# Patient Record
Sex: MALE | HISPANIC OR LATINO | Employment: FULL TIME | URBAN - METROPOLITAN AREA
[De-identification: names, ages, dates, MRNs, and addresses within clinical notes are randomized per-mention and may not be internally consistent; named-entity substitution may affect disease eponyms.]

---

## 2021-11-22 ENCOUNTER — APPOINTMENT (OUTPATIENT)
Dept: GENERAL RADIOLOGY | Facility: CLINIC | Age: 21
End: 2021-11-22
Attending: EMERGENCY MEDICINE

## 2021-11-22 ENCOUNTER — HOSPITAL ENCOUNTER (EMERGENCY)
Facility: CLINIC | Age: 21
Discharge: HOME | End: 2021-11-22
Attending: NURSE PRACTITIONER | Admitting: NURSE PRACTITIONER

## 2021-11-22 VITALS
SYSTOLIC BLOOD PRESSURE: 168 MMHG | TEMPERATURE: 98.5 F | HEART RATE: 104 BPM | DIASTOLIC BLOOD PRESSURE: 85 MMHG | RESPIRATION RATE: 14 BRPM | HEIGHT: 63 IN | OXYGEN SATURATION: 99 %

## 2021-11-22 DIAGNOSIS — S93.409A ANKLE SPRAIN: ICD-10-CM

## 2021-11-22 PROCEDURE — 99283 EMERGENCY DEPT VISIT LOW MDM: CPT

## 2021-11-22 PROCEDURE — 73610 X-RAY EXAM OF ANKLE: CPT | Mod: RT

## 2021-11-22 ASSESSMENT — ENCOUNTER SYMPTOMS
WOUND: 0
COLOR CHANGE: 0
WEAKNESS: 0
NUMBNESS: 0
ARTHRALGIAS: 1

## 2021-11-22 NOTE — LETTER
November 22, 2021      To Whom It May Concern:      Clemente Layne was seen in our Emergency Department today, 11/22/21.  For ankle sprain    Sincerely,        CORTEZ Hughes CNP

## 2021-11-23 NOTE — DISCHARGE INSTRUCTIONS
Ibuprofen or Naproxen and/or Tylenol scheduled for the next 3-5 days. 600 mg (three tabs) ibuprofen, 440 mg (two tabs) Naproxen, 650-1000 mg of Tylenol.  Ibuprofen and Tylenol are every 6 hours Naproxen is 2 times daily. Follow directions.  I recommend ice in the first 24-48 hours. Apply ice for no more than 20 minutes at a time with at least an hour break in between applications.  3-5 times daily is recommended.

## 2021-11-23 NOTE — ED TRIAGE NOTES
Patient states hurt right ankle at work 2 weeks ago.  Still limping.  Also stepped on a nail to the left foot 5 days ago.  Just wants it looked at.

## 2022-05-16 ENCOUNTER — HOSPITAL ENCOUNTER (EMERGENCY)
Facility: CLINIC | Age: 22
Discharge: HOME | End: 2022-05-16
Attending: EMERGENCY MEDICINE | Admitting: EMERGENCY MEDICINE

## 2022-05-16 VITALS
SYSTOLIC BLOOD PRESSURE: 138 MMHG | OXYGEN SATURATION: 98 % | HEIGHT: 65 IN | BODY MASS INDEX: 38.32 KG/M2 | DIASTOLIC BLOOD PRESSURE: 75 MMHG | HEART RATE: 81 BPM | TEMPERATURE: 97.4 F | WEIGHT: 230 LBS | RESPIRATION RATE: 16 BRPM

## 2022-05-16 DIAGNOSIS — R10.32 LLQ ABDOMINAL PAIN: Primary | ICD-10-CM

## 2022-05-16 LAB
ALBUMIN SERPL-MCNC: 4.1 G/DL (ref 3.4–5)
ALBUMIN UR-MCNC: 10 MG/DL
ALP SERPL-CCNC: 88 U/L (ref 40–150)
ALT SERPL W P-5'-P-CCNC: 50 U/L (ref 0–70)
ANION GAP SERPL CALCULATED.3IONS-SCNC: 5 MMOL/L (ref 3–14)
APPEARANCE UR: CLEAR
AST SERPL W P-5'-P-CCNC: 49 U/L (ref 0–45)
BASOPHILS # BLD AUTO: 0.1 10E3/UL (ref 0–0.2)
BASOPHILS NFR BLD AUTO: 1 %
BILIRUB SERPL-MCNC: 0.6 MG/DL (ref 0.2–1.3)
BILIRUB UR QL STRIP: NEGATIVE
BUN SERPL-MCNC: 9 MG/DL (ref 7–30)
CALCIUM SERPL-MCNC: 9.7 MG/DL (ref 8.5–10.1)
CHLORIDE BLD-SCNC: 105 MMOL/L (ref 94–109)
CO2 SERPL-SCNC: 27 MMOL/L (ref 20–32)
COLOR UR AUTO: YELLOW
CREAT SERPL-MCNC: 0.69 MG/DL (ref 0.66–1.25)
EOSINOPHIL # BLD AUTO: 0.1 10E3/UL (ref 0–0.7)
EOSINOPHIL NFR BLD AUTO: 1 %
ERYTHROCYTE [DISTWIDTH] IN BLOOD BY AUTOMATED COUNT: 13 % (ref 10–15)
GFR SERPL CREATININE-BSD FRML MDRD: >90 ML/MIN/1.73M2
GLUCOSE BLD-MCNC: 97 MG/DL (ref 70–99)
GLUCOSE UR STRIP-MCNC: NEGATIVE MG/DL
HCT VFR BLD AUTO: 52 % (ref 40–53)
HGB BLD-MCNC: 17.2 G/DL (ref 13.3–17.7)
HGB UR QL STRIP: NEGATIVE
IMM GRANULOCYTES # BLD: 0.1 10E3/UL
IMM GRANULOCYTES NFR BLD: 1 %
KETONES UR STRIP-MCNC: NEGATIVE MG/DL
LEUKOCYTE ESTERASE UR QL STRIP: NEGATIVE
LIPASE SERPL-CCNC: 64 U/L (ref 73–393)
LYMPHOCYTES # BLD AUTO: 3 10E3/UL (ref 0.8–5.3)
LYMPHOCYTES NFR BLD AUTO: 23 %
MCH RBC QN AUTO: 27.9 PG (ref 26.5–33)
MCHC RBC AUTO-ENTMCNC: 33.1 G/DL (ref 31.5–36.5)
MCV RBC AUTO: 84 FL (ref 78–100)
MONOCYTES # BLD AUTO: 1 10E3/UL (ref 0–1.3)
MONOCYTES NFR BLD AUTO: 7 %
MUCOUS THREADS #/AREA URNS LPF: PRESENT /LPF
NEUTROPHILS # BLD AUTO: 9.1 10E3/UL (ref 1.6–8.3)
NEUTROPHILS NFR BLD AUTO: 67 %
NITRATE UR QL: NEGATIVE
NRBC # BLD AUTO: 0 10E3/UL
NRBC BLD AUTO-RTO: 0 /100
PH UR STRIP: 6.5 [PH] (ref 5–7)
PLATELET # BLD AUTO: 289 10E3/UL (ref 150–450)
POTASSIUM BLD-SCNC: 3.7 MMOL/L (ref 3.4–5.3)
PROT SERPL-MCNC: 8.1 G/DL (ref 6.8–8.8)
RBC # BLD AUTO: 6.17 10E6/UL (ref 4.4–5.9)
RBC URINE: <1 /HPF
SODIUM SERPL-SCNC: 137 MMOL/L (ref 133–144)
SP GR UR STRIP: 1.03 (ref 1–1.03)
UROBILINOGEN UR STRIP-MCNC: NORMAL MG/DL
WBC # BLD AUTO: 13.4 10E3/UL (ref 4–11)
WBC URINE: 1 /HPF

## 2022-05-16 PROCEDURE — 96374 THER/PROPH/DIAG INJ IV PUSH: CPT

## 2022-05-16 PROCEDURE — 36415 COLL VENOUS BLD VENIPUNCTURE: CPT | Performed by: STUDENT IN AN ORGANIZED HEALTH CARE EDUCATION/TRAINING PROGRAM

## 2022-05-16 PROCEDURE — 80053 COMPREHEN METABOLIC PANEL: CPT | Performed by: STUDENT IN AN ORGANIZED HEALTH CARE EDUCATION/TRAINING PROGRAM

## 2022-05-16 PROCEDURE — 250N000011 HC RX IP 250 OP 636: Performed by: STUDENT IN AN ORGANIZED HEALTH CARE EDUCATION/TRAINING PROGRAM

## 2022-05-16 PROCEDURE — 99284 EMERGENCY DEPT VISIT MOD MDM: CPT | Mod: 25

## 2022-05-16 PROCEDURE — 85025 COMPLETE CBC W/AUTO DIFF WBC: CPT | Performed by: STUDENT IN AN ORGANIZED HEALTH CARE EDUCATION/TRAINING PROGRAM

## 2022-05-16 PROCEDURE — 81001 URINALYSIS AUTO W/SCOPE: CPT | Performed by: STUDENT IN AN ORGANIZED HEALTH CARE EDUCATION/TRAINING PROGRAM

## 2022-05-16 PROCEDURE — 83690 ASSAY OF LIPASE: CPT | Performed by: STUDENT IN AN ORGANIZED HEALTH CARE EDUCATION/TRAINING PROGRAM

## 2022-05-16 RX ORDER — KETOROLAC TROMETHAMINE 15 MG/ML
15 INJECTION, SOLUTION INTRAMUSCULAR; INTRAVENOUS ONCE
Status: COMPLETED | OUTPATIENT
Start: 2022-05-16 | End: 2022-05-16

## 2022-05-16 RX ADMIN — KETOROLAC TROMETHAMINE 15 MG: 15 INJECTION, SOLUTION INTRAMUSCULAR; INTRAVENOUS at 18:15

## 2022-05-16 ASSESSMENT — ENCOUNTER SYMPTOMS
SORE THROAT: 0
BACK PAIN: 0
SHORTNESS OF BREATH: 0
APPETITE CHANGE: 0
CONSTIPATION: 0
COUGH: 0
FEVER: 0
BLOOD IN STOOL: 0
HEMATURIA: 0
CHILLS: 0
FLANK PAIN: 1
ABDOMINAL PAIN: 1
RHINORRHEA: 0
VOMITING: 0
NERVOUS/ANXIOUS: 0
DIARRHEA: 0
HEADACHES: 0
DYSURIA: 0
NAUSEA: 0

## 2022-05-16 NOTE — LETTER
May 16, 2022      To Whom It May Concern:      Clemente Layne was seen in our Emergency Department today, 05/16/22.  I expect his condition to improve.  He may return to work when improved.    Sincerely,        Mile Herndon RN

## 2022-05-16 NOTE — ED TRIAGE NOTES
LLQ abdomen pain last 3 days, some nausea but no vomiting. Been lifting more weights lately.      Triage Assessment     Row Name 05/16/22 1250       Triage Assessment (Adult)    Airway WDL WDL       Respiratory WDL    Respiratory WDL WDL       Skin Circulation/Temperature WDL    Skin Circulation/Temperature WDL WDL       Cardiac WDL    Cardiac WDL WDL       Peripheral/Neurovascular WDL    Peripheral Neurovascular WDL WDL       Cognitive/Neuro/Behavioral WDL    Cognitive/Neuro/Behavioral WDL WDL

## 2022-05-16 NOTE — ED PROVIDER NOTES
"ED ATTENDING PHYSICIAN NOTE:   I evaluated this patient in conjunction with BRANDIN Almonte. I have participated in the care of the patient and personally performed key elements of the history, exam, and medical decision making.      HPI:   Clemente Layne is a 21 year old male who presents to the ED with 3 days of intermittent LLQ abdominal pain. He states that his pain worsens with movement or when sitting upright, improving when laying supine. He also notes left flank pain. The patient endorses a past appendectomy. He reports no history of diabetes, hypertension, or hypercholesterolemia. At this time, he denies any back pain, chest pain, or shortness of breath. He denies any nausea or vomiting. There is reportedly no constipation, diarrhea, or bloody stools. He also notes no fever, chills, sore throat, ear pain, or cough. Dysuria, hematuria, and penile discharge are additionally denied.    EXAM:   /75   Pulse 81   Temp 97.4  F (36.3  C) (Temporal)   Resp 16   Ht 1.651 m (5' 5\")   Wt 104.3 kg (230 lb)   SpO2 98%   BMI 38.27 kg/m    General: Alert, appears well-developed and well-nourished. Cooperative.     In no acute distress  HEENT:  Head:  Atraumatic  Ears:  External ears are normal  Mouth/Throat:  Oropharynx is without erythema or exudate and mucous membranes are moist.   Eyes:   Conjunctivae normal and EOM are normal. No scleral icterus.  CV:  Normal rate, regular rhythm, normal heart sounds and radial pulses are 2+ and symmetric.  No murmur.  Resp:  Breath sounds are clear bilaterally    Non-labored, no retractions or accessory muscle use  GI:  Abdomen is soft, no distension, no tenderness. No rebound or guarding.  No CVA tenderness bilaterally  MS:  Normal range of motion. No edema.    Normal strength in all 4 extremities.     Back atraumatic.    No midline cervical, thoracic, or lumbar tenderness  Skin:  Warm and dry.  No rash or lesions noted.  Neuro: Alert. Normal strength.  GCS: " 15  Psych:  Normal mood and affect.    Labs Ordered and Resulted from Time of ED Arrival to Time of ED Departure   COMPREHENSIVE METABOLIC PANEL - Abnormal       Result Value    Sodium 137      Potassium 3.7      Chloride 105      Carbon Dioxide (CO2) 27      Anion Gap 5      Urea Nitrogen 9      Creatinine 0.69      Calcium 9.7      Glucose 97      Alkaline Phosphatase 88      AST 49 (*)     ALT 50      Protein Total 8.1      Albumin 4.1      Bilirubin Total 0.6      GFR Estimate >90     LIPASE - Abnormal    Lipase 64 (*)    ROUTINE UA WITH MICROSCOPIC REFLEX TO CULTURE - Abnormal    Color Urine Yellow      Appearance Urine Clear      Glucose Urine Negative      Bilirubin Urine Negative      Ketones Urine Negative      Specific Gravity Urine 1.027      Blood Urine Negative      pH Urine 6.5      Protein Albumin Urine 10  (*)     Urobilinogen Urine Normal      Nitrite Urine Negative      Leukocyte Esterase Urine Negative      Mucus Urine Present (*)     RBC Urine <1      WBC Urine 1     CBC WITH PLATELETS AND DIFFERENTIAL - Abnormal    WBC Count 13.4 (*)     RBC Count 6.17 (*)     Hemoglobin 17.2      Hematocrit 52.0      MCV 84      MCH 27.9      MCHC 33.1      RDW 13.0      Platelet Count 289      % Neutrophils 67      % Lymphocytes 23      % Monocytes 7      % Eosinophils 1      % Basophils 1      % Immature Granulocytes 1      NRBCs per 100 WBC 0      Absolute Neutrophils 9.1 (*)     Absolute Lymphocytes 3.0      Absolute Monocytes 1.0      Absolute Eosinophils 0.1      Absolute Basophils 0.1      Absolute Immature Granulocytes 0.1      Absolute NRBCs 0.0       No orders to display       MEDICAL DECISION MAKING/ASSESSMENT AND PLAN:   Clemente Layne presents with abdominal pain.  The differential diagnosis is broad and includes:  cholecystitis, peptic ulcer disease, diverticulitis, bowel obstruction, ischemia, pancreatitis, amongst others.  Based on clinical exam, laboratory testing, and urinalysis, no  significant significant etiologies for the pain were found.  The pain has improved with interventions in the ED. had a long discussion about emergent CT imaging tonight of the abdomen and pelvis but has the patient's pain is fully resolved and I am able to deeply palpate in all quadrants of the abdomen in the setting of a patient with a prior appendectomy, low concern for other sinister surgical or infectious pathology requiring emergent CT imaging this evening.  Plan to reassess with his primary care provider in 2 days for recheck.  If he is unable to get in with his primary care provider he could return to the emergency department if his symptoms persist or worsen.  He will be discharged, and was warned that persistent or worsening symptoms should prompt re-examination by a physician (ED if necessary).  Discharged home     DIAGNOSIS:     ICD-10-CM    1. LLQ abdominal pain  R10.32        DISPOSITION:   Discharged to home.      5/16/2022  [facility] EMERGENCY DEPT    Scribe Disclosure:  I, Peng Gu, am serving as a scribe on 5/16/2022 at 5:50 PM to document services personally performed by Franck Edwards MD based on my observations and the provider's statements to me.        Franck Edwards MD  05/16/22 2008

## 2022-05-16 NOTE — ED PROVIDER NOTES
"  History   Chief Complaint:  Abdominal Pain       The history is provided by the patient.      Clemente Layne is a 21 year old male with history of appendicitis who presents with abdominal pain. The patient reports that he began experiencing intermittent abdominal pain about 2 days ago. He describes his pain as \"sharp, like a needle,\" localized to the lower left quadrant. The patient states that his pain worsens with movement or when sitting upright, improving when laying supine.  He also notes radiation of his pain to his left flank, but he denies radiation of pain into his testicles.  He reports no family history of GI issues. He endorses a past appendectomy about 10 years ago. He denies sexual activity. He denies chest pain, shortness of breath, fever, nausea, vomiting, constipation, diarrhea, dysuria, hematuria, penile discharge, or bloody stools.  Last bowel movement was yesterday and was normal.    Patient denies a personal history of diabetes, hypertension, hypercholesterolemia, or cardiac issues.    Review of Systems   Constitutional: Negative for appetite change, chills and fever.   HENT: Negative for ear pain, rhinorrhea and sore throat.    Respiratory: Negative for cough and shortness of breath.    Cardiovascular: Negative for chest pain.   Gastrointestinal: Positive for abdominal pain (LLQ). Negative for blood in stool, constipation, diarrhea, nausea and vomiting.   Genitourinary: Positive for flank pain (L). Negative for dysuria, hematuria, penile discharge and testicular pain.   Musculoskeletal: Negative for back pain.   Skin: Negative for rash.   Neurological: Negative for headaches.   Psychiatric/Behavioral: The patient is not nervous/anxious.    All other systems reviewed and are negative.      Allergies:  No Known Drug Allergies     Medications:  The patient is currently on no regular medications.     Past Medical History:     ADHD    Past Surgical History:  Appendectomy    Family " "History:  Father: Diabetes    Social History:  Presents to the emergency department with his father   Denies smoking, alcohol, or other drug use  Arrives via car     Physical Exam     Patient Vitals for the past 24 hrs:   BP Temp Temp src Pulse Resp SpO2 Height Weight   05/16/22 1900 138/75 -- -- 81 -- 98 % -- --   05/16/22 1234 (!) 160/91 97.4  F (36.3  C) Temporal 83 16 98 % 1.651 m (5' 5\") 104.3 kg (230 lb)       Physical Exam  Vitals: Reviewed, as above.   General: Alert and oriented, in mild distress. Resting on bed.  Skin: Warm and well-perfused. No rashes, lesions, or erythema.   HEENT: Head: Normocephalic, atraumatic. Facial features symmetric. Eyes: Conjunctiva pink, sclera white. EOMs grossly intact. Ears: Auricles without lesion, erythema, or edema. Mouth and throat: Lips are moist with no chapping, lesions, or edema, Buccal mucosa is pink and moist without lesions. Oropharyngeal mucosa is pink and moist with no erythema, edema, or exudate.   Neck: Supple with no lymphadenopathy. Full ROM.   Pulmonary: Chest wall expansion symmetric with no increased work of breathing. Lungs clear to auscultation bilaterally.   Cardiovascular: Heart RRR with no murmurs, rubs, or gallops. 2+ radial and tibialis posterior pulses bilaterally. No peripheral edema.  Abdominal: No CVA tenderness.  Abdomen is obese with no hernias, scars, lesions, or distension. Bowel sounds present and physiologic. Abdomen is soft and nontender to light and deep palpation in all 4 quadrants with no guarding or rebound. No masses or organomegaly.   Musculoskeletal: Moves all extremities spontaneously.  Psych: Affect appropriate.  Answers questions appropriately. Patient appears calm.      Emergency Department Course       Laboratory:  Labs Ordered and Resulted from Time of ED Arrival to Time of ED Departure   COMPREHENSIVE METABOLIC PANEL - Abnormal       Result Value    Sodium 137      Potassium 3.7      Chloride 105      Carbon Dioxide (CO2) " 27      Anion Gap 5      Urea Nitrogen 9      Creatinine 0.69      Calcium 9.7      Glucose 97      Alkaline Phosphatase 88      AST 49 (*)     ALT 50      Protein Total 8.1      Albumin 4.1      Bilirubin Total 0.6      GFR Estimate >90     LIPASE - Abnormal    Lipase 64 (*)    ROUTINE UA WITH MICROSCOPIC REFLEX TO CULTURE - Abnormal    Color Urine Yellow      Appearance Urine Clear      Glucose Urine Negative      Bilirubin Urine Negative      Ketones Urine Negative      Specific Gravity Urine 1.027      Blood Urine Negative      pH Urine 6.5      Protein Albumin Urine 10  (*)     Urobilinogen Urine Normal      Nitrite Urine Negative      Leukocyte Esterase Urine Negative      Mucus Urine Present (*)     RBC Urine <1      WBC Urine 1     CBC WITH PLATELETS AND DIFFERENTIAL - Abnormal    WBC Count 13.4 (*)     RBC Count 6.17 (*)     Hemoglobin 17.2      Hematocrit 52.0      MCV 84      MCH 27.9      MCHC 33.1      RDW 13.0      Platelet Count 289      % Neutrophils 67      % Lymphocytes 23      % Monocytes 7      % Eosinophils 1      % Basophils 1      % Immature Granulocytes 1      NRBCs per 100 WBC 0      Absolute Neutrophils 9.1 (*)     Absolute Lymphocytes 3.0      Absolute Monocytes 1.0      Absolute Eosinophils 0.1      Absolute Basophils 0.1      Absolute Immature Granulocytes 0.1      Absolute NRBCs 0.0            Emergency Department Course:     Reviewed:  I reviewed nursing notes and vitals    Assessments:  1752 I obtained history and examined the patient as noted above.    Dr. Edwards's evaluation.      Interventions:  Medications   ketorolac (TORADOL) injection 15 mg (15 mg Intravenous Given 5/16/22 1815)        Disposition:  The patient was discharged to home.     Impression & Plan     CMS Diagnoses: None      Medical Decision Making:  Clemente is a 21 year old male with history of appendicitis who presents with abdominal pain.  Please see HPI and physical exam for full details.  Differential diagnosis  included diverticulitis, bowel obstruction, ureteral stone, hernia, testicular torsion, appendicitis of appendiceal stump, mesenteric lymphadenitis, cholecystitis, constipation, musculoskeletal strain, among others.  Patient has reassuring physical exam with no rebound tenderness or guarding or hernias.  He is afebrile and also otherwise vitally stable.  Laboratory evaluation is very reassuring.  Urinalysis is negative for infection or hematuria.  He has been improved with 1 dose of Toradol.  I am reassured regarding pattern of his symptoms, and I am less concerned for sinister intra-abdominal pathology.  There is no need for emergent imaging at this time. Return precautions were however provided, and he was instructed to follow-up with his primary care provider in 2 days.  Patient is comfortable this plan.  He is discharged home in stable condition.        Diagnosis:    ICD-10-CM    1. LLQ abdominal pain  R10.32        Scribe Disclosure:  I, Peng Gu, am serving as a scribe at 5:50 PM on 5/16/2022 to document services personally performed by Milli Verma PA-C based on my observations and the provider's statements to me.            Milli Verma PA-C  05/16/22 2024

## 2022-05-17 NOTE — DISCHARGE INSTRUCTIONS
OK to take tylenol 650-100mg every 6 hours as needed for pain.  Also OK to take ibuprofen 400-600mg every 6 hours as needed for pain/fever/discomfort.